# Patient Record
Sex: MALE | NOT HISPANIC OR LATINO | URBAN - METROPOLITAN AREA
[De-identification: names, ages, dates, MRNs, and addresses within clinical notes are randomized per-mention and may not be internally consistent; named-entity substitution may affect disease eponyms.]

---

## 2023-10-06 DIAGNOSIS — Z00.00 HEALTH MAINTENANCE EXAMINATION: ICD-10-CM

## 2023-11-20 ENCOUNTER — APPOINTMENT (OUTPATIENT)
Dept: ORTHOPEDIC SURGERY | Facility: CLINIC | Age: 78
End: 2023-11-20
Payer: MEDICARE

## 2023-11-28 DIAGNOSIS — Z00.00 HEALTH MAINTENANCE EXAMINATION: ICD-10-CM

## 2023-11-29 ENCOUNTER — HOSPITAL ENCOUNTER (OUTPATIENT)
Dept: CARDIOLOGY | Facility: HOSPITAL | Age: 78
Discharge: HOME | End: 2023-11-29

## 2023-11-29 ENCOUNTER — NUTRITION (OUTPATIENT)
Dept: PRIMARY CARE | Facility: CLINIC | Age: 78
End: 2023-11-29

## 2023-11-29 ENCOUNTER — HOSPITAL ENCOUNTER (OUTPATIENT)
Dept: RADIOLOGY | Facility: HOSPITAL | Age: 78
Discharge: HOME | End: 2023-11-29
Payer: MEDICARE

## 2023-11-29 ENCOUNTER — ANCILLARY PROCEDURE (OUTPATIENT)
Dept: RADIOLOGY | Facility: CLINIC | Age: 78
End: 2023-11-29
Payer: MEDICARE

## 2023-11-29 ENCOUNTER — APPOINTMENT (OUTPATIENT)
Dept: INTEGRATIVE MEDICINE | Facility: CLINIC | Age: 78
End: 2023-11-29

## 2023-11-29 ENCOUNTER — HOSPITAL ENCOUNTER (OUTPATIENT)
Dept: VASCULAR MEDICINE | Facility: HOSPITAL | Age: 78
Discharge: HOME | End: 2023-11-29
Payer: MEDICARE

## 2023-11-29 ENCOUNTER — OFFICE VISIT (OUTPATIENT)
Dept: PRIMARY CARE | Facility: CLINIC | Age: 78
End: 2023-11-29

## 2023-11-29 ENCOUNTER — APPOINTMENT (OUTPATIENT)
Dept: RADIOLOGY | Facility: HOSPITAL | Age: 78
End: 2023-11-29
Payer: MEDICARE

## 2023-11-29 VITALS
DIASTOLIC BLOOD PRESSURE: 78 MMHG | HEIGHT: 67 IN | BODY MASS INDEX: 24.96 KG/M2 | WEIGHT: 159 LBS | OXYGEN SATURATION: 98 % | HEART RATE: 57 BPM | SYSTOLIC BLOOD PRESSURE: 126 MMHG

## 2023-11-29 DIAGNOSIS — Z00.00 HEALTH MAINTENANCE EXAMINATION: ICD-10-CM

## 2023-11-29 DIAGNOSIS — Z00.00 HEALTH MAINTENANCE EXAMINATION: Primary | ICD-10-CM

## 2023-11-29 DIAGNOSIS — F33.42 RECURRENT MAJOR DEPRESSIVE DISORDER, IN FULL REMISSION (CMS-HCC): ICD-10-CM

## 2023-11-29 DIAGNOSIS — H93.19 TINNITUS, UNSPECIFIED LATERALITY: ICD-10-CM

## 2023-11-29 DIAGNOSIS — Z13.6 ENCOUNTER FOR SCREENING FOR CARDIOVASCULAR DISORDERS: ICD-10-CM

## 2023-11-29 DIAGNOSIS — R09.89 OTHER SPECIFIED SYMPTOMS AND SIGNS INVOLVING THE CIRCULATORY AND RESPIRATORY SYSTEMS: ICD-10-CM

## 2023-11-29 DIAGNOSIS — H91.90 HEARING LOSS, UNSPECIFIED HEARING LOSS TYPE, UNSPECIFIED LATERALITY: ICD-10-CM

## 2023-11-29 LAB
25(OH)D3 SERPL-MCNC: 78 NG/ML (ref 30–100)
ALBUMIN SERPL BCP-MCNC: 4.2 G/DL (ref 3.4–5)
ALP SERPL-CCNC: 57 U/L (ref 33–136)
ALT SERPL W P-5'-P-CCNC: 24 U/L (ref 10–52)
ANION GAP SERPL CALC-SCNC: 14 MMOL/L (ref 10–20)
AST SERPL W P-5'-P-CCNC: 17 U/L (ref 9–39)
BASOPHILS # BLD AUTO: 0.04 X10*3/UL (ref 0–0.1)
BASOPHILS NFR BLD AUTO: 0.8 %
BILIRUB SERPL-MCNC: 0.5 MG/DL (ref 0–1.2)
BUN SERPL-MCNC: 21 MG/DL (ref 6–23)
CALCIUM SERPL-MCNC: 9 MG/DL (ref 8.6–10.3)
CHLORIDE SERPL-SCNC: 105 MMOL/L (ref 98–107)
CHOLEST SERPL-MCNC: 190 MG/DL (ref 0–199)
CHOLESTEROL/HDL RATIO: 3.4
CO2 SERPL-SCNC: 24 MMOL/L (ref 21–32)
CREAT SERPL-MCNC: 1.06 MG/DL (ref 0.5–1.3)
CRP SERPL HS-MCNC: 0.6 MG/L
EOSINOPHIL # BLD AUTO: 0.18 X10*3/UL (ref 0–0.4)
EOSINOPHIL NFR BLD AUTO: 3.8 %
ERYTHROCYTE [DISTWIDTH] IN BLOOD BY AUTOMATED COUNT: 14.1 % (ref 11.5–14.5)
EST. AVERAGE GLUCOSE BLD GHB EST-MCNC: 111 MG/DL
FERRITIN SERPL-MCNC: 98 NG/ML (ref 20–300)
GFR SERPL CREATININE-BSD FRML MDRD: 73 ML/MIN/1.73M*2
GLUCOSE SERPL-MCNC: 79 MG/DL (ref 74–99)
HBA1C MFR BLD: 5.5 %
HCT VFR BLD AUTO: 44.8 % (ref 41–52)
HDLC SERPL-MCNC: 56.4 MG/DL
HGB BLD-MCNC: 14.5 G/DL (ref 13.5–17.5)
IMM GRANULOCYTES # BLD AUTO: 0.03 X10*3/UL (ref 0–0.5)
IMM GRANULOCYTES NFR BLD AUTO: 0.6 % (ref 0–0.9)
INSULIN P FAST SERPL-ACNC: 6 UIU/ML (ref 3–25)
IRON SATN MFR SERPL: 44 % (ref 25–45)
IRON SERPL-MCNC: 152 UG/DL (ref 35–150)
LDLC SERPL CALC-MCNC: 111 MG/DL
LYMPHOCYTES # BLD AUTO: 1.72 X10*3/UL (ref 0.8–3)
LYMPHOCYTES NFR BLD AUTO: 36 %
MAGNESIUM SERPL-MCNC: 2.2 MG/DL (ref 1.6–2.4)
MCH RBC QN AUTO: 30.9 PG (ref 26–34)
MCHC RBC AUTO-ENTMCNC: 32.4 G/DL (ref 32–36)
MCV RBC AUTO: 96 FL (ref 80–100)
MONOCYTES # BLD AUTO: 0.5 X10*3/UL (ref 0.05–0.8)
MONOCYTES NFR BLD AUTO: 10.5 %
NEUTROPHILS # BLD AUTO: 2.31 X10*3/UL (ref 1.6–5.5)
NEUTROPHILS NFR BLD AUTO: 48.3 %
NON HDL CHOLESTEROL: 134 MG/DL (ref 0–149)
NRBC BLD-RTO: 0 /100 WBCS (ref 0–0)
PLATELET # BLD AUTO: 174 X10*3/UL (ref 150–450)
POC APPEARANCE, URINE: CLEAR
POC BILIRUBIN, URINE: NEGATIVE
POC BLOOD, URINE: NEGATIVE
POC COLOR, URINE: YELLOW
POC GLUCOSE, URINE: NEGATIVE MG/DL
POC KETONES, URINE: NEGATIVE MG/DL
POC LEUKOCYTES, URINE: NEGATIVE
POC NITRITE,URINE: POSITIVE
POC PH, URINE: 5.5 PH
POC PROTEIN, URINE: NEGATIVE MG/DL
POC SPECIFIC GRAVITY, URINE: 1.02
POC UROBILINOGEN, URINE: 0.2 EU/DL
POTASSIUM SERPL-SCNC: 4.4 MMOL/L (ref 3.5–5.3)
PROT SERPL-MCNC: 6.3 G/DL (ref 6.4–8.2)
PSA SERPL-MCNC: 1.08 NG/ML
RBC # BLD AUTO: 4.69 X10*6/UL (ref 4.5–5.9)
SODIUM SERPL-SCNC: 139 MMOL/L (ref 136–145)
T4 FREE SERPL-MCNC: 0.68 NG/DL (ref 0.61–1.12)
TIBC SERPL-MCNC: 346 UG/DL (ref 240–445)
TRIGL SERPL-MCNC: 113 MG/DL (ref 0–149)
TSH SERPL-ACNC: 4.63 MIU/L (ref 0.44–3.98)
UIBC SERPL-MCNC: 194 UG/DL (ref 110–370)
URATE SERPL-MCNC: 5 MG/DL (ref 4–7.5)
VIT B12 SERPL-MCNC: 569 PG/ML (ref 211–911)
VLDL: 23 MG/DL (ref 0–40)
WBC # BLD AUTO: 4.8 X10*3/UL (ref 4.4–11.3)

## 2023-11-29 PROCEDURE — 84153 ASSAY OF PSA TOTAL: CPT

## 2023-11-29 PROCEDURE — 71046 X-RAY EXAM CHEST 2 VIEWS: CPT | Mod: FR

## 2023-11-29 PROCEDURE — 83036 HEMOGLOBIN GLYCOSYLATED A1C: CPT

## 2023-11-29 PROCEDURE — 1126F AMNT PAIN NOTED NONE PRSNT: CPT | Performed by: INTERNAL MEDICINE

## 2023-11-29 PROCEDURE — 84439 ASSAY OF FREE THYROXINE: CPT

## 2023-11-29 PROCEDURE — 83525 ASSAY OF INSULIN: CPT

## 2023-11-29 PROCEDURE — 1159F MED LIST DOCD IN RCRD: CPT | Performed by: INTERNAL MEDICINE

## 2023-11-29 PROCEDURE — 36415 COLL VENOUS BLD VENIPUNCTURE: CPT

## 2023-11-29 PROCEDURE — 93018 CV STRESS TEST I&R ONLY: CPT | Performed by: INTERNAL MEDICINE

## 2023-11-29 PROCEDURE — 84550 ASSAY OF BLOOD/URIC ACID: CPT

## 2023-11-29 PROCEDURE — 93017 CV STRESS TEST TRACING ONLY: CPT

## 2023-11-29 PROCEDURE — 1160F RVW MEDS BY RX/DR IN RCRD: CPT | Performed by: INTERNAL MEDICINE

## 2023-11-29 PROCEDURE — 75571 CT HRT W/O DYE W/CA TEST: CPT

## 2023-11-29 PROCEDURE — 86141 C-REACTIVE PROTEIN HS: CPT

## 2023-11-29 PROCEDURE — 80061 LIPID PANEL: CPT

## 2023-11-29 PROCEDURE — 82607 VITAMIN B-12: CPT

## 2023-11-29 PROCEDURE — SMAR2 SMART-UHCSM: Performed by: NURSE PRACTITIONER

## 2023-11-29 PROCEDURE — 93880 EXTRACRANIAL BILAT STUDY: CPT | Performed by: INTERNAL MEDICINE

## 2023-11-29 PROCEDURE — 93016 CV STRESS TEST SUPVJ ONLY: CPT | Performed by: INTERNAL MEDICINE

## 2023-11-29 PROCEDURE — 83735 ASSAY OF MAGNESIUM: CPT

## 2023-11-29 PROCEDURE — 84443 ASSAY THYROID STIM HORMONE: CPT

## 2023-11-29 PROCEDURE — 83550 IRON BINDING TEST: CPT

## 2023-11-29 PROCEDURE — 93880 EXTRACRANIAL BILAT STUDY: CPT

## 2023-11-29 PROCEDURE — 82172 ASSAY OF APOLIPOPROTEIN: CPT

## 2023-11-29 PROCEDURE — 71046 X-RAY EXAM CHEST 2 VIEWS: CPT | Mod: FOREIGN READ | Performed by: RADIOLOGY

## 2023-11-29 PROCEDURE — 83540 ASSAY OF IRON: CPT

## 2023-11-29 PROCEDURE — EXAM4 EXAM 4: Performed by: INTERNAL MEDICINE

## 2023-11-29 PROCEDURE — 80053 COMPREHEN METABOLIC PANEL: CPT

## 2023-11-29 PROCEDURE — 93978 VASCULAR STUDY: CPT | Performed by: INTERNAL MEDICINE

## 2023-11-29 PROCEDURE — 82728 ASSAY OF FERRITIN: CPT

## 2023-11-29 PROCEDURE — 82306 VITAMIN D 25 HYDROXY: CPT

## 2023-11-29 PROCEDURE — 93978 VASCULAR STUDY: CPT

## 2023-11-29 PROCEDURE — 81002 URINALYSIS NONAUTO W/O SCOPE: CPT | Performed by: INTERNAL MEDICINE

## 2023-11-29 PROCEDURE — 84402 ASSAY OF FREE TESTOSTERONE: CPT

## 2023-11-29 PROCEDURE — 85025 COMPLETE CBC W/AUTO DIFF WBC: CPT

## 2023-11-29 PROCEDURE — NUTCO NUTRITION CONSULTATION: Performed by: DIETITIAN, REGISTERED

## 2023-11-29 RX ORDER — CLOBETASOL PROPIONATE 0.46 MG/ML
SOLUTION TOPICAL
COMMUNITY
Start: 2023-10-25

## 2023-11-29 RX ORDER — CHOLECALCIFEROL (VITAMIN D3) 50 MCG
2000 TABLET ORAL
COMMUNITY
Start: 2012-11-07

## 2023-11-29 RX ORDER — TAMSULOSIN HYDROCHLORIDE 0.4 MG/1
0.4 CAPSULE ORAL
COMMUNITY
Start: 2018-12-18

## 2023-11-29 RX ORDER — VENLAFAXINE HYDROCHLORIDE 75 MG/1
75 CAPSULE, EXTENDED RELEASE ORAL
COMMUNITY
Start: 2018-11-12

## 2023-11-29 RX ORDER — POLYETHYLENE GLYCOL 3350 17 G/17G
POWDER, FOR SOLUTION ORAL
COMMUNITY
Start: 2021-11-08 | End: 2024-02-08 | Stop reason: ALTCHOICE

## 2023-11-29 RX ORDER — MIRTAZAPINE 15 MG/1
15 TABLET, FILM COATED ORAL NIGHTLY
COMMUNITY

## 2023-11-29 RX ORDER — CLONAZEPAM 0.5 MG/1
TABLET ORAL
COMMUNITY
Start: 2019-10-09 | End: 2023-12-28

## 2023-11-29 ASSESSMENT — PAIN SCALES - GENERAL: PAINLEVEL: 0-NO PAIN

## 2023-11-29 NOTE — PROGRESS NOTES
"Subjective   Patient ID: Rj Rizzo is a 78 y.o. male who presents for Executive Physical .    At the time of your evaluation you were feeling well with no major concerns.    H/o Mood Disorder-    Sleep disruption/insomnia    BPH    Hypertension        HPI     Review of Systems   Constitutional: Negative.    HENT: Negative.     Eyes: Negative.    Respiratory: Negative.     Cardiovascular: Negative.    Gastrointestinal: Negative.    Endocrine: Negative.    Genitourinary: Negative.    Musculoskeletal: Negative.    Skin: Negative.    Allergic/Immunologic: Negative.    Neurological: Negative.    Hematological: Negative.    Psychiatric/Behavioral: Negative.     All other systems reviewed and are negative.            No Fever/chills/headaches/dizziness/chest pains/ cough/ shortness of breath/palpitations/ abdominal pain /Nausea/vomiting/diarrhea/ constipation/urine frequency/blood in urine.    Sleep difficulty    Objective   /78 (BP Location: Right arm, Patient Position: Sitting, BP Cuff Size: Adult)   Pulse 57   Ht 1.702 m (5' 7\")   SpO2 98%     Physical Exam    No JVP elevation. No palpable Lymph Nodes. No Thyromegaly    HEENT- Negative    CVS-NL S1/S2 . No MRG    Lungs-CTA. B/S= B/L    Abdomen-Soft, Non-tender. No masses or HSM    Extremities: No C/C/E    No masses or hernia. Normal prostate     Skin-No atypical moles/rash         Assessment/Plan       Cardiology- Normal EKG, , Exercise stress test, Carotid and Abdominal Aorta ultrasound studies.   CT-Cardiac score= 241 with 4.0 cm thoracic aorta aneurysm-this is consistent with the moderate coronary artery disease risk continues.  I would recommend that you follow-up with cardiology for surveillance regarding elevated CT cardiac score and thoracic aortic aneurysm.    History of elevated blood pressure.  Your current levels are in the normal range. No antihypertensive Rx is recommended at this time.    Mood disorder-continue with venlafaxine XR 75 mg " daily.  No Symptoms of depression or anxiety at this time    Insomnia -continue with mirtazapine 15  mg nightly-consider sleep medicine consult    Benign prostatic hyperplasia/BPH-continue with tamsulosin 0.4 mg daily.  Consider urology consult if  lower urinary tract symptoms worsen.    Cancer screening- you are current with colonoscopy,prostate and lung cancer screening tests.      Skin - Please follow up with dermatology for annual examination.Please use a broad-spectrum sunscreen with an SPF of 30 or higher. Monitor moles for ABCDE ( asymmetry, border irregularity, color changes, diameter> pencil eraser and evolving )    Vaccine-I would  recommend a shingles (Shingrix) vaccine if not yet completed.  In addition I would recommend a tetanus booster every 10 years to maintain immunity.  Recommend RSV  and  pneumonia vaccine (Prevnar 20)   Consider COVID-19 booster and  flu shot this winter.    Wellness : Please continue with a balanced diet and a regular physical activity program for at least 150 minutes/week of moderate exercise and 30 minutes/week of resistance/weight training per week.  Try to get 6 to 8 hours of sleep per night.  Please download the calm or headspace stephanie from the [employer] to assist with stress and sleep management if necessary.     Thank you for attending the  Executive Health Program. Please call me at [phone] should you have any questions/comments about your results.   MD Elieser Gomez Master Clinician in Wellness    Senior Attending Physician , [facility]    [facility]    Clinical     [facility] [employer]    [place], [place]    This note was partially generated using the Dragon voice recognition system.  There may be some incorrect wording ,grammar, spelling or punctuation errors that were not corrected prior to committing the note to the medical record.

## 2023-11-29 NOTE — PROGRESS NOTES
Reason for Nutrition Visit:  It was a pleasure meeting Mr. Rizzo today to discuss diet and nutrition as part of his initial executive physical.    Past Medical Hx:  Patient Active Problem List   Diagnosis    Recurrent major depressive disorder, in full remission (CMS/[facility])        Weight change:  -155lbs.  Significant Weight Change: No    Lab Results   Component Value Date    HGBA1C 5.4 11/10/2022    CHOL 190 11/29/2023    TRIG 113 11/29/2023    HDL 56.4 11/29/2023        Food and Nutrition Hx:  Reports eating coffee with oatmeal for breakfast, salad for lunch.  If he goes out to eat for lunch he will order salmon.  He and his wife eat dinner around 5pm.  Occasionally he will have sweets after dinner.  He does not usually snack between meals.  He tries to stop eating by 7-8pm.  He eats out for lunch twice a week.  He eats out for dinner once a week.  He and his wife both enjoy cooking.  He is eating fish at least 3 times a week.  He tries to limit his red meat intake to once a week.      24 Diet Recall:  Breakfast (8:00am): coffee (2.5 cups), oatmeal (plain) with berries and maple sugar, sometimes will have nut roll or coffee cake with this  Lunch (12:00pm): salad-ruby lettuce, onion, avocado, chicken salad (pre-made from [employer]), olive oil  Dinner (5:00pm): shrimp (8-9), rice (hope), vegetables (green beans, peppers)  Snack (6:00pm): gelato    Beverages: water-he aims for 5-6 glasses a day (stops drinking by 6-6:30pm); coffee-2.5 cups a day (half and half and a little sugar); alcohol-3-4 glasses of wine a week    Allergies: Halibut  Intolerance: None    GI Symptoms : bloating Frequency: intermittent.  He has a bowel movement everyday.    Exercise: 6 days a week, 3 miles walking on the treadmill; 30 minutes of pilates once a week.    Sleep duration/quality : 6.5-7.5 hours a night.  Sometimes has a difficult time falling asleep, maybe once a week.  He wakes up to go to the bathroom and usually falls  back to sleep easily.  He watches TV before bed.      Supplements: Vitamin C, Vitamin D, and Melatonin daily. Vitamin D-5000IUs/day; Magnesium 360mg/day; Zinc; L-Theanine    Cravings: Sweet (black licorice), potato chips  Energy Levels: High    Estimated Energy Needs:    Weight Maintanence: 1975 kcal/day ([place] [address] Jeor x 1.4 activity factor)  Protein Needs: 110-125g/day (0.7-0.8g/lb BW, 155lbs)    Nutrition Diagnosis:    Diagnosis Statement 1:  Diagnosis Status: New  Diagnosis : Inadequate protein intake  related to  food- and nutrition-related knowledge deficit regarding appropriate protein intake  as evidenced by  food recall showing he is not meeting the recommended 110-125g protein per day    Diagnosis Statement 2:  Diagnosis Status: New  Diagnosis : Inadequate fiber intake  related to  food- and nutrition-related knowledge deficit regarding appropriate fiber intake  as evidenced by  food recall showing he is not meeting the recommended 30-35g fiber per day    Nutrition Interventions:  Increased Fiber Diet and Increased Protein Diet  Nutrition Counseling: Goal Setting    Nutrition Goals:  Nutrition Goals : Blood glucose control  Initiate Exercise Regimen  Reduce LDL level  Adequate protein intake  Adequate fiber intake    Nutrition Recommendations:    1) To help create well balanced meals while being mindful of portion sizes, use the plate model for portioning out your meals.  Fill 1/2 of your plate with non-starchy vegetables, 1/4 of your plate with lean protein (~5oz per meal), and 1/4 of your plate with complex carbohydrates/whole grains.  Each meal should contain the following 3 components: protein + fiber + healthy fats.    2) Aim for 30-35g fiber daily.  One way to help you reach this goal is to include non-starchy vegetables with both lunch and dinner (at least 1-2 cups).  Be sure to include a wide variety of colorful vegetables throughout the week.  Also, be sure to use 100% whole wheat/whole grain  breads/pastas, brown/wild rice, quinoa, oats, beans, lentils, starchy vegetables-potatoes, sweet potatoes, corn, peas, winter squash and limit/avoid white, processed carbohydrates (white bread, white pasta, white rice, etc).    3) Ensure you are getting adequate amounts of protein consistently throughout the day.  Your daily protein goal is 110-125g.  Aim for 30-40g per meal and include 10-15g protein at snacks.    4) Options for increasing protein and fiber at breakfast:  -chicken sausage ([employer], 3 small links provides 10g protein)  -2 eggs (provides 12-14g protein)  -Greek yogurt + 1/2 cup Paola Crunch cereal + fruit  -oatmeal + 1-2 scoop protein powder or collagen powder + nuts (almonds, walnuts, pecans) and/or seeds (emir seeds, flax seeds)  -pre-made protein drink such as OWYN plant based protein drink (this brand has an option for 20g protein per bottle and 32g protein per bottle)  -Recommended protein powders: Vital Proteins collagen powder (unflavored), Be Well by [employer] grass fed beef protein powder, OWYN plant based protein powder, Ascent grass fed whey protein powder    5) One way to increase your fiber intake in your day is to include high fiber complex carbohydrates to your salads at lunch such as chick peas or black beans.    6) Recommended supplement modifications:  -Reduce Vitamin D supplement dose from 5000IUs per day to 3000-4000IUs/day  -Try taking your magnesium closer to bedtime (20-30 minutes before bedtime).  You could also increase your dose to 4 capsules (up from 3).    7) Incorporate strength training 2-3 days a week.

## 2023-12-02 LAB — LPA SERPL-MCNC: <6 MG/DL

## 2023-12-03 LAB
TESTOSTERONE FREE (CHAN): 66.3 PG/ML (ref 30–135)
TESTOSTERONE,TOTAL,LC-MS/MS: 383 NG/DL (ref 250–1100)

## 2023-12-10 ASSESSMENT — ENCOUNTER SYMPTOMS
CARDIOVASCULAR NEGATIVE: 1
RESPIRATORY NEGATIVE: 1
PSYCHIATRIC NEGATIVE: 1
CONSTITUTIONAL NEGATIVE: 1
HEMATOLOGIC/LYMPHATIC NEGATIVE: 1
NEUROLOGICAL NEGATIVE: 1
EYES NEGATIVE: 1
MUSCULOSKELETAL NEGATIVE: 1
ENDOCRINE NEGATIVE: 1
GASTROINTESTINAL NEGATIVE: 1
ALLERGIC/IMMUNOLOGIC NEGATIVE: 1

## 2024-02-01 ENCOUNTER — APPOINTMENT (OUTPATIENT)
Dept: AUDIOLOGY | Facility: CLINIC | Age: 79
End: 2024-02-01
Payer: MEDICARE

## 2024-02-01 ENCOUNTER — APPOINTMENT (OUTPATIENT)
Dept: OTOLARYNGOLOGY | Facility: CLINIC | Age: 79
End: 2024-02-01
Payer: MEDICARE

## 2024-02-06 ENCOUNTER — APPOINTMENT (OUTPATIENT)
Dept: OTOLARYNGOLOGY | Facility: CLINIC | Age: 79
End: 2024-02-06
Payer: MEDICARE

## 2024-02-06 PROBLEM — N40.0 BENIGN PROSTATIC HYPERPLASIA WITHOUT LOWER URINARY TRACT SYMPTOMS: Status: ACTIVE | Noted: 2024-02-06

## 2024-02-06 PROBLEM — Z86.0100 PERSONAL HISTORY OF COLONIC POLYPS: Status: ACTIVE | Noted: 2024-02-06

## 2024-02-06 PROBLEM — M76.899 ENTHESOPATHY OF HIP REGION: Status: ACTIVE | Noted: 2024-02-06

## 2024-02-06 PROBLEM — E55.9 VITAMIN D DEFICIENCY: Status: ACTIVE | Noted: 2024-02-06

## 2024-02-06 PROBLEM — R00.1 BRADYCARDIA: Status: ACTIVE | Noted: 2019-10-16

## 2024-02-06 PROBLEM — I10 ESSENTIAL HYPERTENSION: Status: ACTIVE | Noted: 2019-10-16

## 2024-02-06 PROBLEM — Z86.010 PERSONAL HISTORY OF COLONIC POLYPS: Status: ACTIVE | Noted: 2024-02-06

## 2024-02-06 RX ORDER — FINASTERIDE 1 MG/1
1 TABLET, FILM COATED ORAL
COMMUNITY
Start: 2023-11-07

## 2024-02-08 ENCOUNTER — CLINICAL SUPPORT (OUTPATIENT)
Dept: AUDIOLOGY | Facility: CLINIC | Age: 79
End: 2024-02-08
Payer: MEDICARE

## 2024-02-08 ENCOUNTER — OFFICE VISIT (OUTPATIENT)
Dept: OTOLARYNGOLOGY | Facility: CLINIC | Age: 79
End: 2024-02-08
Payer: MEDICARE

## 2024-02-08 VITALS
SYSTOLIC BLOOD PRESSURE: 117 MMHG | HEIGHT: 67 IN | DIASTOLIC BLOOD PRESSURE: 84 MMHG | TEMPERATURE: 98.1 F | HEART RATE: 62 BPM | BODY MASS INDEX: 25.33 KG/M2 | WEIGHT: 161.4 LBS

## 2024-02-08 DIAGNOSIS — H90.3 ASYMMETRIC SNHL (SENSORINEURAL HEARING LOSS): ICD-10-CM

## 2024-02-08 DIAGNOSIS — H90.3 SNHL (SENSORY-NEURAL HEARING LOSS), ASYMMETRICAL: Primary | ICD-10-CM

## 2024-02-08 DIAGNOSIS — H93.13 TINNITUS OF BOTH EARS: ICD-10-CM

## 2024-02-08 DIAGNOSIS — H90.3 SENSORINEURAL HEARING LOSS (SNHL) OF BOTH EARS: Primary | ICD-10-CM

## 2024-02-08 PROCEDURE — 3074F SYST BP LT 130 MM HG: CPT | Performed by: STUDENT IN AN ORGANIZED HEALTH CARE EDUCATION/TRAINING PROGRAM

## 2024-02-08 PROCEDURE — 1126F AMNT PAIN NOTED NONE PRSNT: CPT | Performed by: STUDENT IN AN ORGANIZED HEALTH CARE EDUCATION/TRAINING PROGRAM

## 2024-02-08 PROCEDURE — 1159F MED LIST DOCD IN RCRD: CPT | Performed by: STUDENT IN AN ORGANIZED HEALTH CARE EDUCATION/TRAINING PROGRAM

## 2024-02-08 PROCEDURE — 92550 TYMPANOMETRY & REFLEX THRESH: CPT | Performed by: AUDIOLOGIST

## 2024-02-08 PROCEDURE — 3079F DIAST BP 80-89 MM HG: CPT | Performed by: STUDENT IN AN ORGANIZED HEALTH CARE EDUCATION/TRAINING PROGRAM

## 2024-02-08 PROCEDURE — 92557 COMPREHENSIVE HEARING TEST: CPT | Performed by: AUDIOLOGIST

## 2024-02-08 PROCEDURE — 99213 OFFICE O/P EST LOW 20 MIN: CPT | Performed by: STUDENT IN AN ORGANIZED HEALTH CARE EDUCATION/TRAINING PROGRAM

## 2024-02-08 PROCEDURE — 99203 OFFICE O/P NEW LOW 30 MIN: CPT | Performed by: STUDENT IN AN ORGANIZED HEALTH CARE EDUCATION/TRAINING PROGRAM

## 2024-02-08 PROCEDURE — 1160F RVW MEDS BY RX/DR IN RCRD: CPT | Performed by: STUDENT IN AN ORGANIZED HEALTH CARE EDUCATION/TRAINING PROGRAM

## 2024-02-08 RX ORDER — CALCIUM CARBONATE 300MG(750)
400 TABLET,CHEWABLE ORAL DAILY
COMMUNITY

## 2024-02-08 SDOH — ECONOMIC STABILITY: FOOD INSECURITY: WITHIN THE PAST 12 MONTHS, THE FOOD YOU BOUGHT JUST DIDN'T LAST AND YOU DIDN'T HAVE MONEY TO GET MORE.: NEVER TRUE

## 2024-02-08 SDOH — ECONOMIC STABILITY: FOOD INSECURITY: WITHIN THE PAST 12 MONTHS, YOU WORRIED THAT YOUR FOOD WOULD RUN OUT BEFORE YOU GOT MONEY TO BUY MORE.: NEVER TRUE

## 2024-02-08 ASSESSMENT — LIFESTYLE VARIABLES
HOW OFTEN DO YOU HAVE A DRINK CONTAINING ALCOHOL: 2-4 TIMES A MONTH
AUDIT-C TOTAL SCORE: 2
HOW MANY STANDARD DRINKS CONTAINING ALCOHOL DO YOU HAVE ON A TYPICAL DAY: 1 OR 2
HOW OFTEN DO YOU HAVE SIX OR MORE DRINKS ON ONE OCCASION: NEVER
SKIP TO QUESTIONS 9-10: 1

## 2024-02-08 ASSESSMENT — ENCOUNTER SYMPTOMS
DEPRESSION: 0
LOSS OF SENSATION IN FEET: 0
OCCASIONAL FEELINGS OF UNSTEADINESS: 0

## 2024-02-08 ASSESSMENT — COLUMBIA-SUICIDE SEVERITY RATING SCALE - C-SSRS
2. HAVE YOU ACTUALLY HAD ANY THOUGHTS OF KILLING YOURSELF?: NO
1. IN THE PAST MONTH, HAVE YOU WISHED YOU WERE DEAD OR WISHED YOU COULD GO TO SLEEP AND NOT WAKE UP?: NO
6. HAVE YOU EVER DONE ANYTHING, STARTED TO DO ANYTHING, OR PREPARED TO DO ANYTHING TO END YOUR LIFE?: NO

## 2024-02-08 ASSESSMENT — PATIENT HEALTH QUESTIONNAIRE - PHQ9
2. FEELING DOWN, DEPRESSED OR HOPELESS: NOT AT ALL
SUM OF ALL RESPONSES TO PHQ9 QUESTIONS 1 AND 2: 0
1. LITTLE INTEREST OR PLEASURE IN DOING THINGS: NOT AT ALL

## 2024-02-08 NOTE — PROGRESS NOTES
"AUDIOLOGY ADULT AUDIOMETRIC EVALUATION      Name:  Rj Rizzo  :  1945  Age:  78 y.o.  Date of Evaluation:  2024    HISTORY  Reason for visit:  hearing loss  Mr. Rizzo is seen 2024 at the request of Tello Eastman M.D. for an evaluation of hearing.      Chief complaint:    hearing loss    Hearing loss:  some hearing loss, noticed for about 3-4 years  Tinnitus:   bilateral; not usually bothersome; for about 15 years  Otitis Media: denies  Otologic surgical history:  denies  Dizziness/imbalance:  denies  Otalgia:  denies  Ear pressure/fullness:  denies  History of excessive noise exposure:  heavy equipment and music  Other: none    Hearing aid history: none         EVALUATION  Please find audiogram in \"Media\" tab (Document Type:  Audiology Report) or included at the bottom of this note.    RESULTS   Otoscopic Evaluation: clear canals bilaterally      Immittance Measures (226 Hz probe tone):   Tympanometry is consistent with normal middle ear pressure and normal tympanic membrane mobility bilaterally.       Ipsilateral acoustic reflexes (500-4000 Hz) are absent bilaterally.    Test technique:  standard behavioral technique via TDH earphones and insert earphones.  Reliability is good.    Pure Tone Audiometry:  Hearing sensitivity is in the normal hearing to severe hearing loss range bilaterally    Speech Audiometry:        Right Ear:  Speech Reception Threshold (SRT) was obtained at 20 dBHL                 Speech discrimination score was 100% in quiet when words were presented at 70 dBHL      Left Ear:  Speech Reception Threshold (SRT) was obtained at 20 dBHL                 Speech discrimination score was 92% in quiet when words were presented at 70 dBHL    IMPRESSIONS:  Patient is expected to have communication difficulty in adverse listening environments.    Patient is expected to benefit from devices that provide amplification (e.g., hearing aids) and improve the desired sound " signal over that of background noise.      RECOMMENDATIONS  Continue with ENT follow-up with Tello Eastman M.D.   Hearing Aid Evaluation with an audiologist to discuss hearing technology (such as hearing aids) and services.   Reassess hearing in 1 year (or sooner if medically indicated or if there is a concern for a change in hearing).    Continue with medical follow-up as indicated.      PATIENT EDUCATION  Discussed results and recommendations with patient.  Questions were addressed and the patient was encouraged to contact our department should concerns arise.       DURGA Correia, CCC-A  Licensed Audiologist

## 2024-02-08 NOTE — LETTER
February 8, 2024     Darien Fernandez MD  [address] [place], [facility] [address]  [facility] [id number]    Patient: Rj Rizzo   YOB: 1945   Date of Visit: 2/8/2024       Dear Dr. Darien Fernandez MD:    Thank you for referring Rj Rizzo to me for evaluation. Below are my notes for this consultation.  If you have questions, please do not hesitate to call me. I look forward to following your patient along with you.       Sincerely,     Tello Eastman MD      CC: No Recipients  ______________________________________________________________________________________    SUBJECTIVE  Patient ID: Rj Rizzo is a 78 y.o. male who presents for New Patient Visit (tinnitus).    Here today with his daughter, Kortney.    Patient reports that he recently failed a hearing screen with his PCP. Family has noted gradual decline in hearing. He has baseline tinnitus; high-pitched bird singing, bilateral, no pulsatile. They deny otalgia, otorrhea, and dizziness. They deny a history of prior ear surgery, exposure to ototoxic drugs or agents. 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    Review of Systems  Complete ROS negative except as noted above or on patient intake form and as above.    OBJECTIVE  Physical Exam  CONSTITUTIONAL: Well appearing male who appears stated age.  PSYCHIATRIC: Alert, appropriate mood and affect.  RESPIRATORY: Normal inspiration and expiration and chest wall expansion; no use of accessory muscles to breathe.  VOICE: Clear speech without hoarseness. No stridor nor stertor.  HEAD AND FACE: Symmetric facial features. No cutaneous masses or lesions were visualized.  RIGHT EAR:  Normal external ear and post auricular area, no visible lesions, external auditory canal patent, tympanic membrane intact, no retraction, no signs of mass, effusion, or infection within the middle ear.  LEFT EAR: Normal external ear and post auricular area, no visible  lesions, external auditory canal patent, tympanic membrane intact, no retraction, no signs of mass, effusion, or infection within the middle ear.    --------------------------------------------------  Audiology:  I personally reviewed the patient's audiogram from today.  This demonstrated a slightly asymmetric bilateral sensorineural hearing loss that slopes from normal to severe.  There are several frequencies for the left ear is slightly worse than the right, notably 5367-8159 Hz.  He had excellent word recognition scores bilaterally with amplification.  He had type a tympanograms bilaterally.  --------------------------------------------------    ASSESSMENT/PLAN  Diagnoses and all orders for this visit:  Sensorineural hearing loss (SNHL) of both ears  -     Referral to ENT  Tinnitus of both ears  -     Referral to ENT  Asymmetric SNHL (sensorineural hearing loss)    78 y.o. male presenting with progressive hearing loss.    1.  Bilateral sensorineural hearing loss, asymmetric sensorineural hearing loss, tinnitus  The etiologies of hearing loss were discussed with the patient. We discussed the patient's options which include continued observation and amplification.  We discussed how there is no true treatment for hearing loss.  I advised the patient that hearing aids may be helpful given good word recognition scores.  I advised the patient to protect their remaining hearing.    In addition, we discussed the rare risk that this asymmetry may herald a vestibular schwannoma.  We discussed that the patient is borderline for recommendation of further imaging.  The patient would like to hold for now he will get repeat testing in roughly 1 year.    Patient can otherwise see me as needed.    This note was created using speech recognition transcription software. Despite proofreading, typographical errors may be present that affect the meaning of the content. Please contact my office with any questions.

## 2024-02-08 NOTE — PROGRESS NOTES
SUBJECTIVE  Patient ID: Rj Rizzo is a 78 y.o. male who presents for New Patient Visit (tinnitus).    Here today with his daughter, Kortney.    Patient reports that he recently failed a hearing screen with his PCP. Family has noted gradual decline in hearing. He has baseline tinnitus; high-pitched bird singing, bilateral, no pulsatile. They deny otalgia, otorrhea, and dizziness. They deny a history of prior ear surgery, exposure to ototoxic drugs or agents. 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    Review of Systems  Complete ROS negative except as noted above or on patient intake form and as above.    OBJECTIVE  Physical Exam  CONSTITUTIONAL: Well appearing male who appears stated age.  PSYCHIATRIC: Alert, appropriate mood and affect.  RESPIRATORY: Normal inspiration and expiration and chest wall expansion; no use of accessory muscles to breathe.  VOICE: Clear speech without hoarseness. No stridor nor stertor.  HEAD AND FACE: Symmetric facial features. No cutaneous masses or lesions were visualized.  RIGHT EAR:  Normal external ear and post auricular area, no visible lesions, external auditory canal patent, tympanic membrane intact, no retraction, no signs of mass, effusion, or infection within the middle ear.  LEFT EAR: Normal external ear and post auricular area, no visible lesions, external auditory canal patent, tympanic membrane intact, no retraction, no signs of mass, effusion, or infection within the middle ear.    --------------------------------------------------  Audiology:  I personally reviewed the patient's audiogram from today.  This demonstrated a slightly asymmetric bilateral sensorineural hearing loss that slopes from normal to severe.  There are several frequencies for the left ear is slightly worse than the right, notably 4906-5036 Hz.  He had excellent word recognition scores bilaterally with amplification.  He had type a tympanograms  bilaterally.  --------------------------------------------------    ASSESSMENT/PLAN  Diagnoses and all orders for this visit:  Sensorineural hearing loss (SNHL) of both ears  -     Referral to ENT  Tinnitus of both ears  -     Referral to ENT  Asymmetric SNHL (sensorineural hearing loss)    78 y.o. male presenting with progressive hearing loss.    1.  Bilateral sensorineural hearing loss, asymmetric sensorineural hearing loss, tinnitus  The etiologies of hearing loss were discussed with the patient. We discussed the patient's options which include continued observation and amplification.  We discussed how there is no true treatment for hearing loss.  I advised the patient that hearing aids may be helpful given good word recognition scores.  I advised the patient to protect their remaining hearing.    In addition, we discussed the rare risk that this asymmetry may herald a vestibular schwannoma.  We discussed that the patient is borderline for recommendation of further imaging.  The patient would like to hold for now he will get repeat testing in roughly 1 year.    Patient can otherwise see me as needed.    This note was created using speech recognition transcription software. Despite proofreading, typographical errors may be present that affect the meaning of the content. Please contact my office with any questions.

## 2024-03-14 ENCOUNTER — APPOINTMENT (OUTPATIENT)
Dept: RADIOLOGY | Facility: HOSPITAL | Age: 79
End: 2024-03-14
Payer: MEDICARE

## 2024-09-19 ENCOUNTER — TELEPHONE (OUTPATIENT)
Facility: CLINIC | Age: 79
End: 2024-09-19
Payer: MEDICARE

## 2024-09-19 ENCOUNTER — HOSPITAL ENCOUNTER (OUTPATIENT)
Dept: RADIOLOGY | Facility: CLINIC | Age: 79
Discharge: HOME | End: 2024-09-19
Payer: MEDICARE

## 2024-09-19 DIAGNOSIS — M53.3 COCCYX PAIN: Primary | ICD-10-CM

## 2024-09-19 DIAGNOSIS — M53.3 COCCYX PAIN: ICD-10-CM

## 2024-09-19 PROCEDURE — 72220 X-RAY EXAM SACRUM TAILBONE: CPT

## 2024-09-19 NOTE — TELEPHONE ENCOUNTER
Fell a couple weeks ago, still hurting. Chiropractor said maybe he should be healing already said to call doctor.  Call daughter Kortney who will get dad on conference call    Patient fell about 6 weeks ago. He has been complaining of coccygeal pain. He has been seeing a chiropractor who suggested starting? Steroids for pain relief. Recommend x-ray coccyx prior to starting any steroids/pain medication.

## 2024-09-20 ENCOUNTER — TELEPHONE (OUTPATIENT)
Facility: CLINIC | Age: 79
End: 2024-09-20
Payer: MEDICARE

## 2024-09-20 DIAGNOSIS — M54.50 LOW BACK PAIN, UNSPECIFIED BACK PAIN LATERALITY, UNSPECIFIED CHRONICITY, UNSPECIFIED WHETHER SCIATICA PRESENT: ICD-10-CM

## 2024-09-20 RX ORDER — METHYLPREDNISOLONE 4 MG/1
TABLET ORAL
Qty: 21 TABLET | Refills: 0 | Status: SHIPPED | OUTPATIENT
Start: 2024-09-20 | End: 2024-09-27

## 2024-09-20 NOTE — TELEPHONE ENCOUNTER
LMOVM regarding x-ray sacrum/coccyx. If patient is still experiencing pain, try Voltaren or Lidoderm OTC.? Medrol pack. Follow-up with chiropractor as needed

## 2025-03-10 DIAGNOSIS — H61.20 WAX IN EAR: ICD-10-CM

## 2025-03-10 DIAGNOSIS — H61.20 IMPACTED CERUMEN, UNSPECIFIED LATERALITY: ICD-10-CM

## 2025-03-11 DIAGNOSIS — Z00.00 ANNUAL PHYSICAL EXAM: ICD-10-CM

## 2025-04-01 ENCOUNTER — HOSPITAL ENCOUNTER (OUTPATIENT)
Dept: RADIOLOGY | Facility: HOSPITAL | Age: 80
Discharge: HOME | End: 2025-04-01
Payer: MEDICARE

## 2025-04-01 ENCOUNTER — OFFICE VISIT (OUTPATIENT)
Facility: CLINIC | Age: 80
End: 2025-04-01
Payer: MEDICARE

## 2025-04-01 VITALS
HEART RATE: 77 BPM | DIASTOLIC BLOOD PRESSURE: 76 MMHG | BODY MASS INDEX: 23.81 KG/M2 | SYSTOLIC BLOOD PRESSURE: 124 MMHG | WEIGHT: 152 LBS | OXYGEN SATURATION: 99 %

## 2025-04-01 DIAGNOSIS — R07.89 OTHER CHEST PAIN: Primary | ICD-10-CM

## 2025-04-01 PROCEDURE — 99214 OFFICE O/P EST MOD 30 MIN: CPT | Performed by: INTERNAL MEDICINE

## 2025-04-01 PROCEDURE — 1125F AMNT PAIN NOTED PAIN PRSNT: CPT | Performed by: INTERNAL MEDICINE

## 2025-04-01 PROCEDURE — 71101 X-RAY EXAM UNILAT RIBS/CHEST: CPT | Mod: RT

## 2025-04-01 PROCEDURE — 3078F DIAST BP <80 MM HG: CPT | Performed by: INTERNAL MEDICINE

## 2025-04-01 PROCEDURE — 71101 X-RAY EXAM UNILAT RIBS/CHEST: CPT | Mod: RIGHT SIDE

## 2025-04-01 PROCEDURE — 1159F MED LIST DOCD IN RCRD: CPT | Performed by: INTERNAL MEDICINE

## 2025-04-01 PROCEDURE — 3074F SYST BP LT 130 MM HG: CPT | Performed by: INTERNAL MEDICINE

## 2025-04-01 RX ORDER — LIDOCAINE 50 MG/G
1 PATCH TOPICAL DAILY
Qty: 30 PATCH | Refills: 0 | Status: SHIPPED | OUTPATIENT
Start: 2025-04-01

## 2025-04-01 ASSESSMENT — ENCOUNTER SYMPTOMS
OCCASIONAL FEELINGS OF UNSTEADINESS: 0
LOSS OF SENSATION IN FEET: 0
DEPRESSION: 0

## 2025-04-01 ASSESSMENT — PAIN SCALES - GENERAL: PAINLEVEL_OUTOF10: 6

## 2025-04-01 NOTE — PROGRESS NOTES
Subjective   Patient ID: Rj Rizzo is a 79 y.o. male who presents for Follow-up (Shoulder work done by chiropractor/Ribs painful since x 4 weeks).    HPI     R chest pain x 1 month    Review of Systems      Chest pain started after seeing a DC who was treating his R shoulder and also did some chest wall manipulation      Constitutional: Negative for activity change, appetite change, chills, fatigue and fever.   HENT: Negative for congestion, ear pain, rhinorrhea, sinus pain and sore throat.   Eyes: Negative for pain, discharge and redness.   Respiratory: Negative for cough, shortness of breath and wheezing.   Cardiovascular: Negative for dizziness,palpitations  Gastrointestinal: Negative for abdominal pain.   Skin: Negative for rash.       Objective   Pulse 77   Wt 68.9 kg (152 lb)   SpO2 99%   BMI 23.81 kg/m²     Physical Exam    General: not in acute distress.  HENT:   Head: Normocephalic and atraumatic.   Nose: No congestion or rhinorrhea.   Mouth/Throat:   Mouth: Mucous membranes are moist.   Pharynx: No posterior oropharyngeal erythema.   Eyes:   Conjunctiva/sclera: Conjunctivae normal.   Pupils: Pupils are equal, round, and reactive to light.   Cardiovascular:   Rate and Rhythm: Normal rate and regular rhythm.   Heart sounds: No murmur heard.  No friction rub. No gallop.   Pulmonary:   Effort: Pulmonary effort is normal.   Breath sounds: Normal breath sounds. No wheezing, rhonchi or rales.   Abdominal:   Palpations: Abdomen is soft. There is no mass.   Tenderness: There is no abdominal tenderness. There is no guarding or rebound.   Musculoskeletal:   General: Normal range of motion.   Cervical back: Neck supple.   Lymphadenopathy:   Cervical: No cervical adenopathy.   Skin:  General: Skin is warm and dry.   Findings: No rash.   Neurological:   General: No focal deficit present.   Mental Status-alert.   Psychiatric:   Mood and Affect: Mood normal.            Assessment/Plan        R  sided chest  pain-    DDX:    ? Rib Fx    Rib contusion    Pleurisy    Plan:    CXR/Ribs-R/O fracture/infiltrate/consolidation    Lidoderm patch 12 hrs per day    Advil PRN    Call/My chart/ follow up if symptoms persist/worsen

## 2025-04-24 ENCOUNTER — LAB (OUTPATIENT)
Dept: LAB | Facility: HOSPITAL | Age: 80
End: 2025-04-24
Payer: MEDICARE

## 2025-04-24 DIAGNOSIS — N39.0 URINARY TRACT INFECTION WITHOUT HEMATURIA, SITE UNSPECIFIED: ICD-10-CM

## 2025-04-24 DIAGNOSIS — Z00.00 ENCOUNTER FOR GENERAL ADULT MEDICAL EXAMINATION WITHOUT ABNORMAL FINDINGS: Primary | ICD-10-CM

## 2025-04-24 LAB
25(OH)D3 SERPL-MCNC: 97 NG/ML (ref 30–100)
ALBUMIN SERPL BCP-MCNC: 4.5 G/DL (ref 3.4–5)
ALP SERPL-CCNC: 71 U/L (ref 33–136)
ALT SERPL W P-5'-P-CCNC: 33 U/L (ref 10–52)
ANION GAP SERPL CALC-SCNC: 13 MMOL/L (ref 10–20)
APPEARANCE UR: CLEAR
AST SERPL W P-5'-P-CCNC: 20 U/L (ref 9–39)
BACTERIA #/AREA URNS AUTO: ABNORMAL /HPF
BASOPHILS # BLD AUTO: 0.05 X10*3/UL (ref 0–0.1)
BASOPHILS NFR BLD AUTO: 1 %
BILIRUB SERPL-MCNC: 0.6 MG/DL (ref 0–1.2)
BILIRUB UR STRIP.AUTO-MCNC: NEGATIVE MG/DL
BUN SERPL-MCNC: 29 MG/DL (ref 6–23)
CALCIUM SERPL-MCNC: 10 MG/DL (ref 8.6–10.6)
CHLORIDE SERPL-SCNC: 105 MMOL/L (ref 98–107)
CHOLEST SERPL-MCNC: 204 MG/DL (ref 0–199)
CHOLESTEROL/HDL RATIO: 3.7
CO2 SERPL-SCNC: 26 MMOL/L (ref 21–32)
COLOR UR: ABNORMAL
CREAT SERPL-MCNC: 1.16 MG/DL (ref 0.5–1.3)
CRP SERPL HS-MCNC: 0.8 MG/L
EGFRCR SERPLBLD CKD-EPI 2021: 65 ML/MIN/1.73M*2
EOSINOPHIL # BLD AUTO: 0.11 X10*3/UL (ref 0–0.4)
EOSINOPHIL NFR BLD AUTO: 2.3 %
ERYTHROCYTE [DISTWIDTH] IN BLOOD BY AUTOMATED COUNT: 13.9 % (ref 11.5–14.5)
EST. AVERAGE GLUCOSE BLD GHB EST-MCNC: 108 MG/DL
GLUCOSE SERPL-MCNC: 92 MG/DL (ref 74–99)
GLUCOSE UR STRIP.AUTO-MCNC: NORMAL MG/DL
HBA1C MFR BLD: 5.4 % (ref ?–5.7)
HCT VFR BLD AUTO: 47.6 % (ref 41–52)
HDLC SERPL-MCNC: 55.3 MG/DL
HGB BLD-MCNC: 15.3 G/DL (ref 13.5–17.5)
IMM GRANULOCYTES # BLD AUTO: 0.02 X10*3/UL (ref 0–0.5)
IMM GRANULOCYTES NFR BLD AUTO: 0.4 % (ref 0–0.9)
KETONES UR STRIP.AUTO-MCNC: NEGATIVE MG/DL
LDLC SERPL CALC-MCNC: 127 MG/DL
LEUKOCYTE ESTERASE UR QL STRIP.AUTO: ABNORMAL
LYMPHOCYTES # BLD AUTO: 1.83 X10*3/UL (ref 0.8–3)
LYMPHOCYTES NFR BLD AUTO: 38.1 %
MCH RBC QN AUTO: 30.4 PG (ref 26–34)
MCHC RBC AUTO-ENTMCNC: 32.1 G/DL (ref 32–36)
MCV RBC AUTO: 94 FL (ref 80–100)
MONOCYTES # BLD AUTO: 0.54 X10*3/UL (ref 0.05–0.8)
MONOCYTES NFR BLD AUTO: 11.3 %
MUCOUS THREADS #/AREA URNS AUTO: ABNORMAL /LPF
NEUTROPHILS # BLD AUTO: 2.25 X10*3/UL (ref 1.6–5.5)
NEUTROPHILS NFR BLD AUTO: 46.9 %
NITRITE UR QL STRIP.AUTO: ABNORMAL
NON HDL CHOLESTEROL: 149 MG/DL (ref 0–149)
NRBC BLD-RTO: 0 /100 WBCS (ref 0–0)
PH UR STRIP.AUTO: 6 [PH]
PLATELET # BLD AUTO: 168 X10*3/UL (ref 150–450)
POTASSIUM SERPL-SCNC: 4.4 MMOL/L (ref 3.5–5.3)
PROT SERPL-MCNC: 6.8 G/DL (ref 6.4–8.2)
PROT UR STRIP.AUTO-MCNC: NEGATIVE MG/DL
PSA SERPL-MCNC: 0.27 NG/ML
RBC # BLD AUTO: 5.04 X10*6/UL (ref 4.5–5.9)
RBC # UR STRIP.AUTO: NEGATIVE MG/DL
RBC #/AREA URNS AUTO: ABNORMAL /HPF
SODIUM SERPL-SCNC: 140 MMOL/L (ref 136–145)
SP GR UR STRIP.AUTO: 1.02
T4 FREE SERPL-MCNC: 1 NG/DL (ref 0.78–1.48)
TRIGL SERPL-MCNC: 110 MG/DL (ref 0–149)
TSH SERPL-ACNC: 4.25 MIU/L (ref 0.44–3.98)
UROBILINOGEN UR STRIP.AUTO-MCNC: NORMAL MG/DL
VLDL: 22 MG/DL (ref 0–40)
WBC # BLD AUTO: 4.8 X10*3/UL (ref 4.4–11.3)
WBC #/AREA URNS AUTO: ABNORMAL /HPF

## 2025-04-29 ENCOUNTER — OFFICE VISIT (OUTPATIENT)
Facility: CLINIC | Age: 80
End: 2025-04-29
Payer: MEDICARE

## 2025-04-29 ENCOUNTER — APPOINTMENT (OUTPATIENT)
Facility: CLINIC | Age: 80
End: 2025-04-29
Payer: MEDICARE

## 2025-04-29 VITALS — BODY MASS INDEX: 24.01 KG/M2 | HEIGHT: 67 IN | WEIGHT: 153 LBS | HEART RATE: 68 BPM | OXYGEN SATURATION: 98 %

## 2025-04-29 VITALS — HEIGHT: 67 IN | WEIGHT: 153 LBS | HEART RATE: 68 BPM | BODY MASS INDEX: 24.01 KG/M2 | OXYGEN SATURATION: 98 %

## 2025-04-29 DIAGNOSIS — Z00.00 ROUTINE GENERAL MEDICAL EXAMINATION AT A HEALTH CARE FACILITY: Primary | ICD-10-CM

## 2025-04-29 DIAGNOSIS — R82.81 PYURIA: ICD-10-CM

## 2025-04-29 DIAGNOSIS — R07.89 OTHER CHEST PAIN: ICD-10-CM

## 2025-04-29 PROCEDURE — 1125F AMNT PAIN NOTED PAIN PRSNT: CPT | Performed by: INTERNAL MEDICINE

## 2025-04-29 PROCEDURE — UHSPHYS PR UH SELECT PHYSICAL: Performed by: INTERNAL MEDICINE

## 2025-04-29 PROCEDURE — 93000 ELECTROCARDIOGRAM COMPLETE: CPT | Performed by: INTERNAL MEDICINE

## 2025-04-29 PROCEDURE — 1159F MED LIST DOCD IN RCRD: CPT | Performed by: INTERNAL MEDICINE

## 2025-04-29 PROCEDURE — 1170F FXNL STATUS ASSESSED: CPT | Performed by: INTERNAL MEDICINE

## 2025-04-29 PROCEDURE — G0439 PPPS, SUBSEQ VISIT: HCPCS | Performed by: INTERNAL MEDICINE

## 2025-04-29 RX ORDER — LIDOCAINE 50 MG/G
1 PATCH TOPICAL DAILY
Qty: 30 PATCH | Refills: 3 | Status: SHIPPED | OUTPATIENT
Start: 2025-04-29

## 2025-04-29 ASSESSMENT — PAIN SCALES - GENERAL
PAINLEVEL_OUTOF10: 6
PAINLEVEL_OUTOF10: 6

## 2025-04-29 NOTE — PROGRESS NOTES
"Subjective   Reason for Visit: Rj Rizzo is an 79 y.o. male here for a Medicare Wellness visit.     AWV:    R & L  shoulder Arthralgia    BPH    Mood disorder    Kidney stones               Reviewed all medications by prescribing practitioner or clinical pharmacist (such as prescriptions, OTCs, herbal therapies and supplements) and documented in the medical record.    HPI    Patient Care Team:  Darien Fernandez MD as PCP - General (Internal Medicine)     Review of Systems   Constitutional:  Negative for appetite change, fatigue and fever.   HENT:  Negative for congestion, ear discharge, ear pain, hearing loss, mouth sores, postnasal drip, sinus pressure, sore throat and tinnitus.    Eyes:  Negative for pain and redness.   Respiratory:  Negative for apnea, cough, chest tightness, shortness of breath and wheezing.    Cardiovascular:  Positive for chest pain. Negative for palpitations and leg swelling.        R chest wall pain post injury   Gastrointestinal: Negative.  Negative for abdominal pain, blood in stool, constipation, diarrhea, nausea and vomiting.   Endocrine: Negative for polydipsia and polyuria.   Genitourinary:  Negative for decreased urine volume, difficulty urinating, dysuria, enuresis, flank pain, frequency, hematuria, penile discharge, penile pain, scrotal swelling, testicular pain and urgency.   Musculoskeletal:  Positive for arthralgias. Negative for back pain, gait problem, joint swelling and myalgias.   Skin:  Negative for pallor and rash.   Allergic/Immunologic: Negative.    Neurological: Negative.  Negative for dizziness, tremors, syncope, numbness and headaches.   Hematological: Negative.  Negative for adenopathy. Does not bruise/bleed easily.   Psychiatric/Behavioral:  Negative for agitation, confusion and sleep disturbance. The patient is not nervous/anxious.    All other systems reviewed and are negative.          Objective   Vitals:  Pulse 68   Ht 1.702 m (5' 7\")   Wt 69.4 kg " (153 lb)   SpO2 98%   BMI 23.96 kg/m²       Physical Exam  Vitals reviewed.   Constitutional:       Appearance: Normal appearance.   HENT:      Head: Normocephalic and atraumatic.      Right Ear: Tympanic membrane and ear canal normal.      Left Ear: Tympanic membrane and ear canal normal.      Nose: Nose normal.      Mouth/Throat:      Mouth: Mucous membranes are moist.   Eyes:      Extraocular Movements: Extraocular movements intact.      Conjunctiva/sclera: Conjunctivae normal.      Pupils: Pupils are equal, round, and reactive to light.   Cardiovascular:      Rate and Rhythm: Normal rate and regular rhythm.      Pulses: Normal pulses.      Heart sounds: Normal heart sounds. No murmur heard.     No friction rub. No gallop.   Pulmonary:      Effort: Pulmonary effort is normal. No respiratory distress.      Breath sounds: Normal breath sounds. No wheezing or rales.   Abdominal:      General: Abdomen is flat. Bowel sounds are normal. There is no distension.      Palpations: Abdomen is soft. There is no mass.      Tenderness: There is no abdominal tenderness. There is no guarding or rebound.      Hernia: No hernia is present.   Genitourinary:     Penis: Normal.       Testes: Normal.      Prostate: Normal.      Rectum: Normal. Guaiac result negative.   Musculoskeletal:         General: No swelling, tenderness or deformity. Normal range of motion.      Cervical back: Normal range of motion.   Skin:     General: Skin is warm.      Findings: No bruising, lesion or rash.   Neurological:      General: No focal deficit present.      Mental Status: He is alert and oriented to person, place, and time.      Sensory: No sensory deficit.      Motor: No weakness.      Coordination: Coordination normal.   Psychiatric:         Mood and Affect: Mood normal.         Behavior: Behavior normal.             Assessment & Plan  Other chest pain    Orders:    lidocaine (Lidoderm) 5 % patch; Place 1 patch over 12 hours on the skin once  daily. Remove & discard patch within 12 hours or as directed by MD.    Routine general medical examination at a health care facility    Orders:    ECG 12 Lead    Pyuria    Orders:    Urine Culture; Future            R & L  shoulder Arthralgia-?  Rotator cuff tendinitis, shoulder impingement syndrome. Consider orthopedics consult For further evaluation and management    BPH-continue with Flomax 0.4 mg daily.  Follow-up with urology as needed    Mood disorder-stable.  Continue with mirtazapine 15 mg nightly    Kidney stones-asymptomatic.Maintain adequate fluid intake ( 64 ounces) per day, limit sodium intake  to below 2300 mg per day and increase fruits & vegetable intake. Please follow a  diet that is low  in protein (red meat, poultry, eggs, and seafood), sodium (salty foods), or sugar (sodas and sugary drinks)     Chest wall pain-?  Rib contusion versus fracture.  Recommend Lidoderm 5% patch  X-ray right ribs-rule out fracture    Continue with current Rx    Follow up 3 months/PRN

## 2025-05-01 ENCOUNTER — TELEPHONE (OUTPATIENT)
Dept: PRIMARY CARE | Facility: CLINIC | Age: 80
End: 2025-05-01
Payer: MEDICARE

## 2025-05-01 LAB — BACTERIA UR CULT: ABNORMAL

## 2025-05-01 NOTE — TELEPHONE ENCOUNTER
T/c with Pt's daughter re garding Urine Cultrure- Staph Epidermidis. He is asymptomatic. Most likely a skin contaminant. Call/Follow up if UTI symptoms develop.

## 2025-05-04 ASSESSMENT — ENCOUNTER SYMPTOMS
SLEEP DISTURBANCE: 0
VOMITING: 0
APPETITE CHANGE: 0
AGITATION: 0
NERVOUS/ANXIOUS: 0
FEVER: 0
GASTROINTESTINAL NEGATIVE: 1
HEMATURIA: 0
POLYDIPSIA: 0
AGITATION: 0
CHEST TIGHTNESS: 0
GASTROINTESTINAL NEGATIVE: 1
ARTHRALGIAS: 1
DIARRHEA: 0
HEADACHES: 0
ALLERGIC/IMMUNOLOGIC NEGATIVE: 1
FATIGUE: 0
PALPITATIONS: 0
POLYDIPSIA: 0
COUGH: 0
COUGH: 0
HEADACHES: 0
ABDOMINAL PAIN: 0
BACK PAIN: 0
BLOOD IN STOOL: 0
JOINT SWELLING: 0
CONSTIPATION: 0
BRUISES/BLEEDS EASILY: 0
CONFUSION: 0
FREQUENCY: 0
EYE REDNESS: 0
HEMATOLOGIC/LYMPHATIC NEGATIVE: 1
NAUSEA: 0
ADENOPATHY: 0
SLEEP DISTURBANCE: 0
NEUROLOGICAL NEGATIVE: 1
BACK PAIN: 0
APNEA: 0
DIZZINESS: 0
DIFFICULTY URINATING: 0
NERVOUS/ANXIOUS: 0
JOINT SWELLING: 0
HEMATURIA: 0
DYSURIA: 0
ABDOMINAL PAIN: 0
DIARRHEA: 0
FEVER: 0
TREMORS: 0
NEUROLOGICAL NEGATIVE: 1
SORE THROAT: 0
HEMATOLOGIC/LYMPHATIC NEGATIVE: 1
FLANK PAIN: 0
SHORTNESS OF BREATH: 0
FLANK PAIN: 0
ALLERGIC/IMMUNOLOGIC NEGATIVE: 1
SINUS PRESSURE: 0
ADENOPATHY: 0
ARTHRALGIAS: 1
MYALGIAS: 0
SORE THROAT: 0
MYALGIAS: 0
DIZZINESS: 0
DYSURIA: 0
BRUISES/BLEEDS EASILY: 0
DIFFICULTY URINATING: 0
FATIGUE: 0
SINUS PRESSURE: 0
NUMBNESS: 0
PALPITATIONS: 0
EYE REDNESS: 0
EYE PAIN: 0
WHEEZING: 0
APNEA: 0
VOMITING: 0
CHEST TIGHTNESS: 0
CONSTIPATION: 0
SHORTNESS OF BREATH: 0
NUMBNESS: 0
CONFUSION: 0
BLOOD IN STOOL: 0
WHEEZING: 0
FREQUENCY: 0
EYE PAIN: 0
APPETITE CHANGE: 0
NAUSEA: 0
TREMORS: 0

## 2025-05-04 ASSESSMENT — ACTIVITIES OF DAILY LIVING (ADL)
GROCERY_SHOPPING: INDEPENDENT
DOING_HOUSEWORK: NEEDS ASSISTANCE
TAKING_MEDICATION: INDEPENDENT
MANAGING_FINANCES: INDEPENDENT
DRESSING: INDEPENDENT
BATHING: INDEPENDENT

## 2025-05-04 ASSESSMENT — PATIENT HEALTH QUESTIONNAIRE - PHQ9
SUM OF ALL RESPONSES TO PHQ9 QUESTIONS 1 AND 2: 0
1. LITTLE INTEREST OR PLEASURE IN DOING THINGS: NOT AT ALL
2. FEELING DOWN, DEPRESSED OR HOPELESS: NOT AT ALL

## 2025-05-04 NOTE — PROGRESS NOTES
Physical Exam    Name Rj Rizzo    Date of Service :5/4/2025      Rj Rizzo  79 y.o. is here for an annual physical exam    Medical History[1]    Review of Systems   Constitutional:  Negative for appetite change, fatigue and fever.   HENT:  Negative for congestion, ear discharge, ear pain, hearing loss, mouth sores, postnasal drip, sinus pressure, sore throat and tinnitus.    Eyes:  Negative for pain and redness.   Respiratory:  Negative for apnea, cough, chest tightness, shortness of breath and wheezing.    Cardiovascular:  Positive for chest pain. Negative for palpitations and leg swelling.          R chest wall pain after injury   Gastrointestinal: Negative.  Negative for abdominal pain, blood in stool, constipation, diarrhea, nausea and vomiting.   Endocrine: Negative for polydipsia and polyuria.   Genitourinary:  Negative for decreased urine volume, difficulty urinating, dysuria, enuresis, flank pain, frequency, hematuria, penile discharge, penile pain, scrotal swelling, testicular pain and urgency.   Musculoskeletal:  Positive for arthralgias. Negative for back pain, gait problem, joint swelling and myalgias.        B/L Shoulder  arthralgia   Skin:  Negative for pallor and rash.   Allergic/Immunologic: Negative.    Neurological: Negative.  Negative for dizziness, tremors, syncope, numbness and headaches.   Hematological: Negative.  Negative for adenopathy. Does not bruise/bleed easily.   Psychiatric/Behavioral:  Negative for agitation, confusion and sleep disturbance. The patient is not nervous/anxious.    All other systems reviewed and are negative.      Past Surgical History:   Procedure Laterality Date    CHOLECYSTECTOMY      COLONOSCOPY      JOINT REPLACEMENT      SPINE SURGERY      TONSILLECTOMY          Social History     Tobacco Use    Smoking status: Never   Vaping Use    Vaping status: Never Used   Substance Use Topics    Alcohol use: Yes     Alcohol/week: 3.0 standard drinks of  "alcohol     Types: 3 Glasses of wine per week    Drug use: Never        Social History     Social History Narrative    Not on file       Family History   Problem Relation Name Age of Onset    Heart disease Mother      Hypertension Mother      Colon cancer Brother          Pulse 68   Ht 1.702 m (5' 7\")   Wt 69.4 kg (153 lb)   SpO2 98%   BMI 23.96 kg/m²     Physical Exam  Vitals reviewed.   Constitutional:       Appearance: Normal appearance.   HENT:      Head: Normocephalic and atraumatic.      Right Ear: Tympanic membrane and ear canal normal.      Left Ear: Tympanic membrane and ear canal normal.      Nose: Nose normal.      Mouth/Throat:      Mouth: Mucous membranes are moist.   Eyes:      Extraocular Movements: Extraocular movements intact.      Conjunctiva/sclera: Conjunctivae normal.      Pupils: Pupils are equal, round, and reactive to light.   Cardiovascular:      Rate and Rhythm: Normal rate and regular rhythm.      Pulses: Normal pulses.      Heart sounds: Normal heart sounds. No murmur heard.     No friction rub. No gallop.   Pulmonary:      Effort: Pulmonary effort is normal. No respiratory distress.      Breath sounds: Normal breath sounds. No wheezing or rales.   Abdominal:      General: Abdomen is flat. Bowel sounds are normal. There is no distension.      Palpations: Abdomen is soft. There is no mass.      Tenderness: There is no abdominal tenderness. There is no guarding or rebound.      Hernia: No hernia is present.   Genitourinary:     Penis: Normal.       Testes: Normal.   Musculoskeletal:         General: No swelling, tenderness or deformity. Normal range of motion.      Cervical back: Normal range of motion.   Skin:     General: Skin is warm.      Findings: No bruising, lesion or rash.   Neurological:      General: No focal deficit present.      Mental Status: He is alert and oriented to person, place, and time.      Sensory: No sensory deficit.      Motor: No weakness.      Coordination: " Coordination normal.   Psychiatric:         Mood and Affect: Mood normal.         Behavior: Behavior normal.               Problem List Items Addressed This Visit    None      Assessment/Plan       R & L  shoulder Arthralgia-?  Rotator cuff tendinitis, shoulder impingement syndrome. Consider orthopedics consult For further evaluation and management    BPH-continue with Flomax 0.4 mg daily.  Follow-up with urology as needed    Mood disorder-stable.  Continue with mirtazapine 15 mg nightly    Kidney stones-asymptomatic.Maintain adequate fluid intake ( 64 ounces) per day, limit sodium intake  to below 2300 mg per day and increase fruits & vegetable intake. Please follow a  diet that is low  in protein (red meat, poultry, eggs, and seafood), sodium (salty foods), or sugar (sodas and sugary drinks)     Chest wall pain-?  Rib contusion versus fracture.  Recommend Lidoderm 5% patch  X-ray right ribs-rule out fracture    Continue with current Rx    Follow up 3 months/PRN        [1]   Past Medical History:  Diagnosis Date    Colon polyps     Depression     Diverticulitis     Gallstone     Hypertension     Insomnia     Insomnia     Kidney stone     Lower back pain     Mood disorder (CMS-HCC)

## 2025-06-16 ENCOUNTER — TELEPHONE (OUTPATIENT)
Facility: CLINIC | Age: 80
End: 2025-06-16
Payer: MEDICARE